# Patient Record
Sex: MALE | Race: BLACK OR AFRICAN AMERICAN | Employment: UNEMPLOYED | URBAN - METROPOLITAN AREA
[De-identification: names, ages, dates, MRNs, and addresses within clinical notes are randomized per-mention and may not be internally consistent; named-entity substitution may affect disease eponyms.]

---

## 2018-12-06 ENCOUNTER — HOSPITAL ENCOUNTER (EMERGENCY)
Age: 48
Discharge: HOME | End: 2018-12-06
Attending: EMERGENCY MEDICINE
Payer: SELF-PAY

## 2018-12-06 VITALS
RESPIRATION RATE: 16 BRPM | WEIGHT: 195 LBS | HEART RATE: 93 BPM | TEMPERATURE: 98 F | BODY MASS INDEX: 29.55 KG/M2 | OXYGEN SATURATION: 97 % | DIASTOLIC BLOOD PRESSURE: 92 MMHG | HEIGHT: 68 IN | SYSTOLIC BLOOD PRESSURE: 138 MMHG

## 2018-12-06 DIAGNOSIS — R07.89 ATYPICAL CHEST PAIN: Primary | ICD-10-CM

## 2018-12-06 LAB
ALBUMIN SERPL-MCNC: 4.1 G/DL (ref 3.5–5)
ALBUMIN/GLOB SERPL: 1.1 {RATIO}
ALP SERPL-CCNC: 74 U/L (ref 50–136)
ALT SERPL-CCNC: 40 U/L (ref 12–65)
ANION GAP SERPL CALC-SCNC: 7 MMOL/L
AST SERPL-CCNC: 19 U/L (ref 15–37)
BASOPHILS # BLD: 0.1 K/UL (ref 0–0.2)
BASOPHILS NFR BLD: 1 % (ref 0–2)
BILIRUB SERPL-MCNC: 0.4 MG/DL (ref 0.2–1.1)
BUN SERPL-MCNC: 15 MG/DL (ref 6–23)
CALCIUM SERPL-MCNC: 9.8 MG/DL (ref 8.3–10.4)
CHLORIDE SERPL-SCNC: 105 MMOL/L (ref 98–107)
CO2 SERPL-SCNC: 29 MMOL/L (ref 21–32)
CREAT SERPL-MCNC: 0.8 MG/DL (ref 0.8–1.5)
DIFFERENTIAL METHOD BLD: ABNORMAL
EOSINOPHIL # BLD: 0.2 K/UL (ref 0–0.8)
EOSINOPHIL NFR BLD: 3 % (ref 0.5–7.8)
ERYTHROCYTE [DISTWIDTH] IN BLOOD BY AUTOMATED COUNT: 12.7 % (ref 11.9–14.6)
GLOBULIN SER CALC-MCNC: 3.9 G/DL (ref 2.3–3.5)
GLUCOSE SERPL-MCNC: 89 MG/DL (ref 65–100)
HCT VFR BLD AUTO: 48.7 % (ref 41.1–50.3)
HGB BLD-MCNC: 15.7 G/DL (ref 13.6–17.2)
IMM GRANULOCYTES # BLD: 0 K/UL (ref 0–0.5)
IMM GRANULOCYTES NFR BLD AUTO: 0 % (ref 0–5)
LYMPHOCYTES # BLD: 1.8 K/UL (ref 0.5–4.6)
LYMPHOCYTES NFR BLD: 21 % (ref 13–44)
MCH RBC QN AUTO: 26.3 PG (ref 26.1–32.9)
MCHC RBC AUTO-ENTMCNC: 32.2 G/DL (ref 31.4–35)
MCV RBC AUTO: 81.4 FL (ref 79.6–97.8)
MONOCYTES # BLD: 0.6 K/UL (ref 0.1–1.3)
MONOCYTES NFR BLD: 8 % (ref 4–12)
NEUTS SEG # BLD: 5.6 K/UL (ref 1.7–8.2)
NEUTS SEG NFR BLD: 67 % (ref 43–78)
NRBC # BLD: 0 K/UL (ref 0–0.2)
PLATELET # BLD AUTO: 246 K/UL (ref 150–450)
PMV BLD AUTO: 10.9 FL (ref 9.4–12.3)
POTASSIUM SERPL-SCNC: 3.6 MMOL/L (ref 3.5–5.1)
PROT SERPL-MCNC: 8 G/DL
RBC # BLD AUTO: 5.98 M/UL (ref 4.23–5.6)
SODIUM SERPL-SCNC: 141 MMOL/L (ref 136–145)
TROPONIN I BLD-MCNC: 0 NG/ML (ref 0.02–0.05)
TROPONIN I BLD-MCNC: 0 NG/ML (ref 0.02–0.05)
WBC # BLD AUTO: 8.3 K/UL (ref 4.3–11.1)

## 2018-12-06 PROCEDURE — 80053 COMPREHEN METABOLIC PANEL: CPT

## 2018-12-06 PROCEDURE — 93005 ELECTROCARDIOGRAM TRACING: CPT | Performed by: EMERGENCY MEDICINE

## 2018-12-06 PROCEDURE — 99285 EMERGENCY DEPT VISIT HI MDM: CPT | Performed by: EMERGENCY MEDICINE

## 2018-12-06 PROCEDURE — 84484 ASSAY OF TROPONIN QUANT: CPT

## 2018-12-06 PROCEDURE — 85025 COMPLETE CBC W/AUTO DIFF WBC: CPT

## 2018-12-06 RX ORDER — SODIUM CHLORIDE 0.9 % (FLUSH) 0.9 %
5-10 SYRINGE (ML) INJECTION EVERY 8 HOURS
Status: DISCONTINUED | OUTPATIENT
Start: 2018-12-06 | End: 2018-12-06 | Stop reason: HOSPADM

## 2018-12-06 RX ORDER — AMLODIPINE BESYLATE 5 MG/1
5 TABLET ORAL DAILY
COMMUNITY

## 2018-12-06 RX ORDER — SODIUM CHLORIDE 0.9 % (FLUSH) 0.9 %
5-10 SYRINGE (ML) INJECTION AS NEEDED
Status: DISCONTINUED | OUTPATIENT
Start: 2018-12-06 | End: 2018-12-06 | Stop reason: HOSPADM

## 2018-12-06 RX ORDER — HYDROCHLOROTHIAZIDE 12.5 MG/1
12.5 TABLET ORAL DAILY
COMMUNITY

## 2018-12-06 NOTE — LETTER
[facility] EMERGENCY DEPT 
[facility] [address] [place] [id number] 
[phone] Work/School Note Date: 12/6/2018 To Whom It May concern: 
 
Ismael Alegria was seen and treated today in the emergency room by the following provider(s): 
Attending Provider: Jonathon Archer MD. Ismael Alegria Special Instructions:  Excuse on this day Sincerely, 
 
 
 
 
Sathish Young MD

## 2018-12-06 NOTE — ED TRIAGE NOTES
Patient co chest pain started one week ago but worse today. Patient states he has a lot of stress and in his life at the moment. Denies sob

## 2018-12-07 LAB
ATRIAL RATE: 99 BPM
CALCULATED P AXIS, ECG09: 52 DEGREES
CALCULATED R AXIS, ECG10: 39 DEGREES
CALCULATED T AXIS, ECG11: 53 DEGREES
DIAGNOSIS, 93000: NORMAL
P-R INTERVAL, ECG05: 146 MS
Q-T INTERVAL, ECG07: 340 MS
QRS DURATION, ECG06: 74 MS
QTC CALCULATION (BEZET), ECG08: 436 MS
VENTRICULAR RATE, ECG03: 99 BPM

## 2018-12-07 NOTE — ED PROVIDER NOTES
Some chest discomfort x 1 week. Feels as though possibly anxiety due to multiple stressors : family/ work. .. CP seems worse at work and not related to exertion. No SOB/ diaphoresis/nausea/ typical radiation. No family history. No fever/cough or sputum. No other changes. Thinks may be having \"anxiety attacks\". Care at [facility], [place], operated by [facility] per patient The history is provided by the patient. Chest Pain (Angina) This is a new problem. The current episode started more than 1 week ago. The problem occurs daily. The pain is associated with stress. The pain is mild. The quality of the pain is described as sharp. The pain does not radiate. The symptoms are aggravated by palpation and movement. Pertinent negatives include no cough, no diaphoresis, no dizziness, no exertional chest pressure, no fever, no irregular heartbeat, no leg pain, no lower extremity edema, no nausea, no orthopnea, no palpitations, no sputum production, no vomiting and no weakness. He has tried nothing for the symptoms. His past medical history is significant for HTN. His past medical history does not include DM, DVT or PE. Past Medical History:  
Diagnosis Date  Hypertension  Neurological disorder   
 anxiety History reviewed. No pertinent surgical history. History reviewed. No pertinent family history. Social History Socioeconomic History  Marital status: SINGLE Spouse name: Not on file  Number of children: Not on file  Years of education: Not on file  Highest education level: Not on file Social Needs  Financial resource strain: Not on file  Food insecurity - worry: Not on file  Food insecurity - inability: Not on file  Transportation needs - medical: Not on file  Transportation needs - non-medical: Not on file Occupational History  Not on file Tobacco Use  Smoking status: Never Smoker  Smokeless tobacco: Never Used Substance and Sexual Activity  Alcohol use:  No  
 Frequency: Never  Drug use: No  
 Sexual activity: Not on file Other Topics Concern  Not on file Social History Narrative  Not on file ALLERGIES: Morphine and Percocet [oxycodone-acetaminophen] Review of Systems Constitutional: Negative. Negative for chills, diaphoresis and fever. HENT: Negative. Respiratory: Negative. Negative for cough and sputum production. Cardiovascular: Positive for chest pain. Negative for palpitations, orthopnea and leg swelling. Gastrointestinal: Negative for nausea and vomiting. Genitourinary: Negative. Musculoskeletal: Negative. Neurological: Negative. Negative for dizziness and weakness. Psychiatric/Behavioral: The patient is nervous/anxious. \"lots of stress\" All other systems reviewed and are negative. Vitals:  
 12/06/18 1742 12/06/18 1905 12/06/18 1906 12/06/18 2030 BP: (!) 154/104 152/79  (!) 138/92 Pulse: 93 Resp: 16 Temp: 98 °F (36.7 °C) SpO2: 98%  99% 97% Weight: 88.5 kg (195 lb) Height: 5' 8\" (1.727 m) Physical Exam  
Constitutional: He is oriented to person, place, and time. He appears well-developed and well-nourished. No distress. HENT:  
Head: Atraumatic. Eyes: No scleral icterus. Neck: Neck supple. Cardiovascular: Normal rate and normal pulses. No murmur heard. Pulmonary/Chest: Effort normal and breath sounds normal. No accessory muscle usage. No respiratory distress. Neurological: He is alert and oriented to person, place, and time. Skin: Skin is warm and dry. Psychiatric: His behavior is normal. Thought content normal. He is not agitated. Flat affect Nursing note and vitals reviewed. MDM Number of Diagnoses or Management Options Atypical chest pain:  
Diagnosis management comments: Atypical pain with no exertional or pleuritic component. Labs/EKG and CXR are all stable. Will have follow up and to return at any point with worsening Amount and/or Complexity of Data Reviewed Clinical lab tests: ordered and reviewed Decide to obtain previous medical records or to obtain history from someone other than the patient: yes Risk of Complications, Morbidity, and/or Mortality Presenting problems: moderate Diagnostic procedures: low Management options: moderate Patient Progress Patient progress: stable Procedures Recent Results (from the past 12 hour(s)) CBC WITH AUTOMATED DIFF Collection Time: 12/06/18  5:47 PM  
Result Value Ref Range WBC 8.3 4.3 - 11.1 K/uL  
 RBC 5.98 (H) 4.23 - 5.6 M/uL  
 HGB 15.7 13.6 - 17.2 g/dL HCT 48.7 41.1 - 50.3 % MCV 81.4 79.6 - 97.8 FL  
 MCH 26.3 26.1 - 32.9 PG  
 MCHC 32.2 31.4 - 35.0 g/dL  
 RDW 12.7 11.9 - 14.6 % PLATELET 015 824 - 989 K/uL MPV 10.9 9.4 - 12.3 FL ABSOLUTE NRBC 0.00 0.0 - 0.2 K/uL  
 DF AUTOMATED NEUTROPHILS 67 43 - 78 % LYMPHOCYTES 21 13 - 44 % MONOCYTES 8 4.0 - 12.0 % EOSINOPHILS 3 0.5 - 7.8 % BASOPHILS 1 0.0 - 2.0 % IMMATURE GRANULOCYTES 0 0.0 - 5.0 %  
 ABS. NEUTROPHILS 5.6 1.7 - 8.2 K/UL  
 ABS. LYMPHOCYTES 1.8 0.5 - 4.6 K/UL  
 ABS. MONOCYTES 0.6 0.1 - 1.3 K/UL  
 ABS. EOSINOPHILS 0.2 0.0 - 0.8 K/UL  
 ABS. BASOPHILS 0.1 0.0 - 0.2 K/UL  
 ABS. IMM. GRANS. 0.0 0.0 - 0.5 K/UL METABOLIC PANEL, COMPREHENSIVE Collection Time: 12/06/18  5:47 PM  
Result Value Ref Range Sodium 141 136 - 145 mmol/L Potassium 3.6 3.5 - 5.1 mmol/L Chloride 105 98 - 107 mmol/L  
 CO2 29 21 - 32 mmol/L Anion gap 7 mmol/L Glucose 89 65 - 100 mg/dL BUN 15 6 - 23 MG/DL Creatinine 0.80 0.8 - 1.5 MG/DL  
 GFR est AA >60 >60 ml/min/1.73m2 GFR est non-AA >60 ml/min/1.73m2 Calcium 9.8 8.3 - 10.4 MG/DL Bilirubin, total 0.4 0.2 - 1.1 MG/DL  
 ALT (SGPT) 40 12 - 65 U/L  
 AST (SGOT) 19 15 - 37 U/L Alk. phosphatase 74 50 - 136 U/L Protein, total 8.0 g/dL Albumin 4.1 3.5 - 5.0 g/dL Globulin 3.9 (H) 2.3 - 3.5 g/dL A-G Ratio 1.1 POC TROPONIN-I Collection Time: 12/06/18  5:47 PM  
Result Value Ref Range Troponin-I (POC) 0 (L) 0.02 - 0.05 ng/ml POC TROPONIN-I Collection Time: 12/06/18  8:05 PM  
Result Value Ref Range Troponin-I (POC) 0 (L) 0.02 - 0.05 ng/ml

## 2018-12-07 NOTE — DISCHARGE INSTRUCTIONS
Over-the-counter medications for discomfort such as Advil or Aleve  Workup and stress related issues as this may be a factor in the chest pain that you've had

## 2018-12-07 NOTE — ED NOTES
I have reviewed discharge instructions with the patient. The patient verbalized understanding. Patient left ED via Discharge Method: ambulatory to Home with self. Opportunity for questions and clarification provided. Patient given 0 scripts. To continue your aftercare when you leave the hospital, you may receive an automated call from our care team to check in on how you are doing. This is a free service and part of our promise to provide the best care and service to meet your aftercare needs.  If you have questions, or wish to unsubscribe from this service please call [phone]. Thank you for Choosing our [facility].